# Patient Record
Sex: FEMALE | Race: BLACK OR AFRICAN AMERICAN | URBAN - METROPOLITAN AREA
[De-identification: names, ages, dates, MRNs, and addresses within clinical notes are randomized per-mention and may not be internally consistent; named-entity substitution may affect disease eponyms.]

---

## 2022-11-25 ENCOUNTER — HOSPITAL ENCOUNTER (OUTPATIENT)
Dept: TELEMEDICINE | Facility: HOSPITAL | Age: 52
Discharge: HOME | End: 2022-11-25
Payer: MEDICAID

## 2022-11-25 DIAGNOSIS — R53.1 LEFT-SIDED WEAKNESS: ICD-10-CM

## 2022-11-25 PROCEDURE — 99204 OFFICE O/P NEW MOD 45 MIN: CPT | Mod: 95,,, | Performed by: STUDENT IN AN ORGANIZED HEALTH CARE EDUCATION/TRAINING PROGRAM

## 2022-11-25 PROCEDURE — 99204 PR OFFICE/OUTPT VISIT, NEW, LEVL IV, 45-59 MIN: ICD-10-PCS | Mod: 95,,, | Performed by: STUDENT IN AN ORGANIZED HEALTH CARE EDUCATION/TRAINING PROGRAM

## 2022-11-25 NOTE — CONSULTS
"      [facility]  Vascular Neurology  Comprehensive Stroke Center  TeleVascular Neurology Acute Consultation Note      Consults    Consulting Provider: ART OLIVEIRA  Current Providers  No providers found    Patient Location: [facility]  Spoke hospital nurse at bedside with patient assisting consultant.     Patient information was obtained from patient, ER records, and primary team.         Assessment/Plan:       Diagnoses:   * Left-sided weakness  51 yo female w/ PMHx DM, AIS (09/2022), HLD, legally blind who presents w/ LSW and dysarthria.   States that she went to bed at around 12 AM and woke up at 7 AM w/ the above mentioned symptoms.   States that she does not recall what her symptoms were of her previous stroke and she denies residual deficits, just recalls that she fell and broke "both of her hips".   States that she is "supposed to be taking a blood thinner" but is not sure what it is and is not taking it. Denies any cardiac issues.   Per nursing staff, patient did report trouble ambulating the night prior, but unable to state if she had unilateral weakness.   BG on arrival >400    NIHSS 6. OOW for TNK/TPA due to LKW being at 12AM.   CTH w/ R thalamocapsular encephalomalacia.     Unclear if patient did suffer another AIS or if this is recrudescence of her symptoms in the setting of hyperglycemia.     Recommend MRI Brain w/o contrast to determine if there is an acute infarct.   Permissive HTN up to 220/110 until AIS is r/o w/ MRI.     If no infarct is identified, please continue w/ secondary stroke prevention measures.     If new infarct is noted, please consider the following recommendations  recommended  Recommendations:  CTA Head & Neck ASAP to evaluate cervico-cephalic vasculature for high risk culprit vessel disease or large/medium vessel occlusion  ECHO w/ bubble study   Lipid profile, " A1c  PT/OT/SLP eval and Rx  Load aspirin 325 mg & clopidogrel 300 mg x 1 now, followed by daily aspirin 81 mg /  clopidogrel 75 mg x 30 days followed by monotherapy thereafter            STROKE DOCUMENTATION     Acute Stroke Times:   Acute Stroke Times   Last Known Normal Date: 11/25/22  Last Known Normal Time: 1200  Symptom Onset Date: 11/24/22  Symptom Onset Time: 0700  Unknown Symptom Onset Time: Unknown Time  Stroke Team Called Date: 11/25/22  Stroke Team Called Time: 1009  Stroke Team Arrival Date: 11/25/22  Stroke Team Arrival Time: 1013  CT Interpretation Time: 1015  Thrombolytic Therapy Recommended: No  CTA Interpretation Time:  (Recommended, pending )    NIH Scale:  1a. Level of Consciousness: 0-->Alert, keenly responsive  1b. LOC Questions: 1-->Answers one question correctly  1c. LOC Commands: 0-->Performs both tasks correctly  2. Best Gaze: 0-->Normal  3. Visual: 0-->No visual loss  4. Facial Palsy: 2-->Partial paralysis (total or near-total paralysis of lower face)  5a. Motor Arm, Left: 0-->No drift, limb holds 90 (or 45) degrees for full 10 secs  5b. Motor Arm, Right: 1-->Drift, limb holds 90 (or 45) degrees, but drifts down before full 10 secs, does not hit bed or other support  6a. Motor Leg, Left: 1-->Drift, leg falls by the end of the 5-sec period but does not hit bed  6b. Motor Leg, Right: 0-->No drift, leg holds 30 degree position for full 5 secs  7. Limb Ataxia: 0-->Absent  8. Sensory: 0-->Normal, no sensory loss  9. Best Language: 0-->No aphasia, normal  10. Dysarthria: 1-->Mild-to-moderate dysarthria, patient slurs at least some words and, at worst, can be understood with some difficulty  11. Extinction and Inattention (formerly Neglect): 0-->No abnormality  Total (NIH Stroke Scale): 6     Modified Mary Score: 2  Mariusz Coma Scale:    ABCD2 Score:    [id number] Score:   HAS -BLED Score:   ICH Score:   Hunt & Soriano Classification:       There were no vitals taken for this visit.  Eligible  "for thrombolytic therapy?: No  Thrombolytic therapy recomended: Thrombolytic therapy not recommended due to Outside of treatment window   Possible Interventional Revascularization Candidate? No; at this time symptoms not suggestive of large vessel occlusion    Disposition Recommendation: admit to inpatient    Subjective:     History of Present Illness:  53 yo female w/ PMHx DM, AIS (09/2022), HLD, legally blind who presents w/ LSW and dysarthria.   States that she went to bed at around 12 AM and woke up at 7 AM w/ the above mentioned symptoms.   States that she does not recall what her symptoms were of her previous stroke and she denies residual deficits, just recalls that she fell and broke "both of her hips".   States that she is "supposed to be taking a blood thinner" but is not sure what it is and is not taking it. Denies any cardiac issues.   Per nursing staff, patient did report trouble ambulating the night prior, but unable to state if she had unilateral weakness.   BG on arrival >400      Woke up with symptoms?: yes    Recent bleeding noted: no  Does the patient take any Blood Thinners? no  Medications: No relevant medications      Past Medical History: diabetes, hyperlipidemia and stroke    Past Surgical History: no major surgeries within the last 2 weeks    Family History: no relevant history    Social History: no smoking, no drinking, no drugs    Allergies: Allergies have not been reviewed No relevant allergies    Review of Systems   Constitutional: Positive for activity change and fatigue.   HENT: Positive for voice change. Negative for trouble swallowing.    Eyes: Positive for visual disturbance (chronic).   Respiratory: Negative for shortness of breath.    Gastrointestinal: Negative for nausea and vomiting.   Endocrine: Negative.    Genitourinary: Negative.    Musculoskeletal: Positive for gait problem.   Skin: Negative.    Neurological: Positive for facial asymmetry, speech difficulty and weakness. " Negative for dizziness, numbness and headaches.   Psychiatric/Behavioral: Positive for decreased concentration.     Objective:   Vitals: There were no vitals taken for this visit. BP: 144/69 and Heart Rate: 83    CT READ: Yes  Abnormal CT R thalamocapsular encephalomalacia .     Physical Exam  Constitutional:       Appearance: Normal appearance.   HENT:      Head: Atraumatic.   Eyes:      Extraocular Movements: Extraocular movements intact.      Comments: Legally blind, unable to identify objects    Cardiovascular:      Rate and Rhythm: Normal rate.   Pulmonary:      Effort: Pulmonary effort is normal.   Neurological:      Mental Status: She is alert. She is disoriented.      Cranial Nerves: Cranial nerve deficit present.      Sensory: No sensory deficit.      Motor: Weakness present.      Comments: Unable to state correct age  LFD, LUE and LLE drift   Dysarthria   Patient is unable to read and is legally blind              Recommended the emergency room physician to have a brief discussion with the patient and/or family if available regarding the  risks and benefits of treatment, and to briefly document the occurrence of that discussion in his clinical encounter note.     The attending portion of this evaluation, treatment, and documentation was performed per Tameka Saravia MD via audiovisual.    Billing code:  (non-intervention mild to moderate stroke, TIA, some mimics)      This patient has a critical neurological condition/illness, with some potential for high morbidity and mortality.  There is a moderate probability for acute neurological change leading to clinical and possibly life-threatening deterioration requiring highest level of physician preparedness for urgent intervention.  Care was coordinated with other physicians involved in the patient's care.  Radiologic studies and laboratory data were reviewed and interpreted, and plan of care was re-assessed based on the results.  Diagnosis, treatment  options and prognosis may have been discussed with the patient and/or family members or caregiver.    In your opinion, this was a: Tier 2 Van Negative    Consult End Time: 10:54 AM     Tameka Saravia MD  [facility]  Vascular Neurology   [facility]

## 2022-11-25 NOTE — SUBJECTIVE & OBJECTIVE
Woke up with symptoms?: yes    Recent bleeding noted: no  Does the patient take any Blood Thinners? no  Medications: No relevant medications      Past Medical History: diabetes, hyperlipidemia and stroke    Past Surgical History: no major surgeries within the last 2 weeks    Family History: no relevant history    Social History: no smoking, no drinking, no drugs    Allergies: Allergies have not been reviewed No relevant allergies    Review of Systems   Constitutional: Positive for activity change and fatigue.   HENT: Positive for voice change. Negative for trouble swallowing.    Eyes: Positive for visual disturbance (chronic).   Respiratory: Negative for shortness of breath.    Gastrointestinal: Negative for nausea and vomiting.   Endocrine: Negative.    Genitourinary: Negative.    Musculoskeletal: Positive for gait problem.   Skin: Negative.    Neurological: Positive for facial asymmetry, speech difficulty and weakness. Negative for dizziness, numbness and headaches.   Psychiatric/Behavioral: Positive for decreased concentration.     Objective:   Vitals: There were no vitals taken for this visit. BP: 144/69 and Heart Rate: 83    CT READ: Yes  Abnormal CT R thalamocapsular encephalomalacia .     Physical Exam  Constitutional:       Appearance: Normal appearance.   HENT:      Head: Atraumatic.   Eyes:      Extraocular Movements: Extraocular movements intact.      Comments: Legally blind, unable to identify objects    Cardiovascular:      Rate and Rhythm: Normal rate.   Pulmonary:      Effort: Pulmonary effort is normal.   Neurological:      Mental Status: She is alert. She is disoriented.      Cranial Nerves: Cranial nerve deficit present.      Sensory: No sensory deficit.      Motor: Weakness present.      Comments: Unable to state correct age  LFD, LUE and LLE drift   Dysarthria   Patient is unable to read and is legally blind

## 2022-11-25 NOTE — HPI
"51 yo female w/ PMHx DM, AIS (09/2022), HLD, legally blind who presents w/ LSW and dysarthria.   States that she went to bed at around 12 AM and woke up at 7 AM w/ the above mentioned symptoms.   States that she does not recall what her symptoms were of her previous stroke and she denies residual deficits, just recalls that she fell and broke "both of her hips".   States that she is "supposed to be taking a blood thinner" but is not sure what it is and is not taking it. Denies any cardiac issues.   Per nursing staff, patient did report trouble ambulating the night prior, but unable to state if she had unilateral weakness.   BG on arrival >400  "

## 2022-11-25 NOTE — ASSESSMENT & PLAN NOTE
"51 yo female w/ PMHx DM, AIS (09/2022), HLD, legally blind who presents w/ LSW and dysarthria.   States that she went to bed at around 12 AM and woke up at 7 AM w/ the above mentioned symptoms.   States that she does not recall what her symptoms were of her previous stroke and she denies residual deficits, just recalls that she fell and broke "both of her hips".   States that she is "supposed to be taking a blood thinner" but is not sure what it is and is not taking it. Denies any cardiac issues.   Per nursing staff, patient did report trouble ambulating the night prior, but unable to state if she had unilateral weakness.   BG on arrival >400    NIHSS 6. OOW for TNK/TPA due to LKW being at 12AM.   CTH w/ R thalamocapsular encephalomalacia.     Unclear if patient did suffer another AIS or if this is recrudescence of her symptoms in the setting of hyperglycemia.     Recommend MRI Brain w/o contrast to determine if there is an acute infarct.   Permissive HTN up to 220/110 until AIS is r/o w/ MRI.     If no infarct is identified, please continue w/ secondary stroke prevention measures.     If new infarct is noted, please consider the following recommendations  recommended  Recommendations:  · CTA Head & Neck ASAP to evaluate cervico-cephalic vasculature for high risk culprit vessel disease or large/medium vessel occlusion  · ECHO w/ bubble study   · Lipid profile, A1c  · PT/OT/SLP eval and Rx  · Load aspirin 325 mg & clopidogrel 300 mg x 1 now, followed by daily aspirin 81 mg /  clopidogrel 75 mg x 30 days followed by monotherapy thereafter      "